# Patient Record
Sex: FEMALE | Race: WHITE | Employment: FULL TIME | URBAN - METROPOLITAN AREA
[De-identification: names, ages, dates, MRNs, and addresses within clinical notes are randomized per-mention and may not be internally consistent; named-entity substitution may affect disease eponyms.]

---

## 2020-08-26 ENCOUNTER — TRANSFERRED RECORDS (OUTPATIENT)
Dept: HEALTH INFORMATION MANAGEMENT | Facility: CLINIC | Age: 55
End: 2020-08-26

## 2020-08-26 ENCOUNTER — MEDICAL CORRESPONDENCE (OUTPATIENT)
Dept: HEALTH INFORMATION MANAGEMENT | Facility: CLINIC | Age: 55
End: 2020-08-26

## 2020-08-27 ENCOUNTER — TELEPHONE (OUTPATIENT)
Dept: PLASTIC SURGERY | Facility: CLINIC | Age: 55
End: 2020-08-27

## 2020-08-27 NOTE — TELEPHONE ENCOUNTER
8/27/2020, 4:16 PM    Called patient at the contact number listed on file; no answer. No patient identifiers on VM. Left basic voicemail with contact info for patient to call back.    DAVE GutierrezT

## 2020-08-28 ENCOUNTER — TRANSCRIBE ORDERS (OUTPATIENT)
Dept: OTHER | Age: 55
End: 2020-08-28

## 2020-08-28 DIAGNOSIS — D16.9 OSTEOMA: Primary | ICD-10-CM

## 2020-09-02 ENCOUNTER — TELEPHONE (OUTPATIENT)
Dept: PLASTIC SURGERY | Facility: CLINIC | Age: 55
End: 2020-09-02

## 2020-09-02 NOTE — TELEPHONE ENCOUNTER
9/2/2020, 1:19 PM    Called patient at the contact number listed on file; no answer. No patient identifiers on VM. Left basic voicemail with contact info for patient to call back.    DAVE GutierrezT

## 2020-09-04 NOTE — TELEPHONE ENCOUNTER
FUTURE VISIT INFORMATION      FUTURE VISIT INFORMATION:    Date: 10/30/20    Time: 8:30am    Location: [facility] [place]  REFERRAL INFORMATION:    Referring providers clinic:  [facility]     Reason for visit/diagnosis  L temporal ?osteoma    RECORDS REQUESTED FROM:       Clinic name Comments Records Status Imaging Status   [facility] Request for recs sent 9/4- received and sent to scaning EPIC

## 2020-10-30 ENCOUNTER — OFFICE VISIT (OUTPATIENT)
Dept: PLASTIC SURGERY | Facility: CLINIC | Age: 55
End: 2020-10-30
Attending: PLASTIC SURGERY
Payer: COMMERCIAL

## 2020-10-30 ENCOUNTER — PRE VISIT (OUTPATIENT)
Dept: PLASTIC SURGERY | Facility: CLINIC | Age: 55
End: 2020-10-30

## 2020-10-30 ENCOUNTER — ANCILLARY PROCEDURE (OUTPATIENT)
Dept: ULTRASOUND IMAGING | Facility: CLINIC | Age: 55
End: 2020-10-30
Attending: PLASTIC SURGERY
Payer: COMMERCIAL

## 2020-10-30 VITALS
DIASTOLIC BLOOD PRESSURE: 86 MMHG | WEIGHT: 291 LBS | BODY MASS INDEX: 39.42 KG/M2 | HEART RATE: 91 BPM | HEIGHT: 72 IN | SYSTOLIC BLOOD PRESSURE: 136 MMHG | OXYGEN SATURATION: 97 %

## 2020-10-30 DIAGNOSIS — D23.39: Primary | ICD-10-CM

## 2020-10-30 DIAGNOSIS — D23.39: ICD-10-CM

## 2020-10-30 DIAGNOSIS — E66.01 MORBID OBESITY (H): ICD-10-CM

## 2020-10-30 PROBLEM — E78.00 HYPERCHOLESTEREMIA: Status: ACTIVE | Noted: 2020-10-30

## 2020-10-30 PROBLEM — F41.9 ANXIETY: Status: ACTIVE | Noted: 2020-10-30

## 2020-10-30 PROCEDURE — 99203 OFFICE O/P NEW LOW 30 MIN: CPT | Performed by: PLASTIC SURGERY

## 2020-10-30 PROCEDURE — 76536 US EXAM OF HEAD AND NECK: CPT | Performed by: RADIOLOGY

## 2020-10-30 RX ORDER — PEN NEEDLE, DIABETIC 32GX 5/32"
NEEDLE, DISPOSABLE MISCELLANEOUS
COMMUNITY
Start: 2020-08-20

## 2020-10-30 RX ORDER — FLASH GLUCOSE SENSOR
KIT MISCELLANEOUS
COMMUNITY
Start: 2019-12-04

## 2020-10-30 RX ORDER — BLOOD SUGAR DIAGNOSTIC
STRIP MISCELLANEOUS
COMMUNITY
Start: 2020-10-02

## 2020-10-30 RX ORDER — LEVOTHYROXINE SODIUM 200 UG/1
TABLET ORAL
COMMUNITY
Start: 2020-10-25

## 2020-10-30 RX ORDER — LISINOPRIL 20 MG/1
TABLET ORAL
COMMUNITY
Start: 2020-10-28

## 2020-10-30 RX ORDER — INSULIN LISPRO 100 [IU]/ML
INJECTION, SOLUTION INTRAVENOUS; SUBCUTANEOUS
COMMUNITY
Start: 2020-10-29

## 2020-10-30 RX ORDER — SEMAGLUTIDE 1.34 MG/ML
INJECTION, SOLUTION SUBCUTANEOUS
COMMUNITY
Start: 2020-10-23

## 2020-10-30 RX ORDER — GLIPIZIDE 10 MG/1
TABLET ORAL
COMMUNITY
Start: 2019-04-15

## 2020-10-30 RX ORDER — ATORVASTATIN CALCIUM 10 MG/1
10 TABLET, FILM COATED ORAL
COMMUNITY
Start: 2019-06-08

## 2020-10-30 RX ORDER — LANCETS 33 GAUGE
EACH MISCELLANEOUS
COMMUNITY
Start: 2020-10-19

## 2020-10-30 RX ORDER — METFORMIN HYDROCHLORIDE 750 MG/1
TABLET, EXTENDED RELEASE ORAL
COMMUNITY
Start: 2020-10-07

## 2020-10-30 RX ORDER — CONJUGATED ESTROGENS 0.62 MG/1
TABLET, FILM COATED ORAL
COMMUNITY
Start: 2020-10-01

## 2020-10-30 ASSESSMENT — PAIN SCALES - GENERAL: PAINLEVEL: NO PAIN (0)

## 2020-10-30 ASSESSMENT — MIFFLIN-ST. JEOR: SCORE: 2016.97

## 2020-10-30 NOTE — NURSING NOTE
Chief Complaint   Patient presents with     Consult     L temporal ? osteoma x3 y       Vitals:    10/30/20 0827   BP: 136/86   BP Location: Left arm   Patient Position: Chair   Cuff Size: Adult Large   Pulse: 91   SpO2: 97%   Height: 1.829 m (6')       There is no height or weight on file to calculate BMI.    Catracho Gray, EMT

## 2020-10-30 NOTE — PROGRESS NOTES
PLASTIC SURGERY HISTORY AND PHYSICAL    Stefany Yoon MRN# [id number]   Age: 55 year old YOB: 1965     Primary care provider: No primary care provider on file.    CHIEF COMPLAINT: LEFT temple lesion      HPI: 55 year old lady with history of acromegaly, Type II DM, HTN, HLD, presents for initial consultation regarding a LEFT temple prominence. She reports noticing this about 3 years ago. Denies antecedent trauma/insult. She reports gradual growth since then which has subjectively accelerated recently. Odell any pain, tenderness at site. She was recently evaluated by two dermatologists, the latter of whom expressed concern for bony undergrowth, osteoma. She did have a soft tissue lesion excised from her LEFT big toe in the remote past. This temple lesion does not impair her. She has not had imaging.    PMH:  Type II DM  HTN  HLD    PSH:  LEFT toe soft tissue lesion excision    FH:  No family history on file.    SH:  Social History     Tobacco Use     Smoking status: Never Smoker     Smokeless tobacco: Never Used   Substance Use Topics     Alcohol use: None     Drug use: None       MEDS:  No current outpatient medications on file.     No current facility-administered medications for this visit.        ALLERGIES:     Allergies   Allergen Reactions     Simvastatin Hives     Sulfa Drugs Hives     PN: LW Reaction: facial swelling, hives     Red Dye Other (See Comments)     Red dyes in cough syrup and cherry pie filling - swollen lips       ROS: Negative except for HPI    EXAM:  No acute distress, very pleasant  Regular  Nonlabored  LEFT TEMPLE with 1.5-2cm soft, mobile prominence, no obvious underlying bony changes, no obvious punctum, no erythema/drainage  Warm, well-perfused    ASSESSMENT/PLAN:  55 year old lady with history of acromegaly, Type II DM, HLD, HTN, with progressive growth of LEFT temple lesion, on exam feels largely soft tissue in nature. I will order an US to delineate whether this  involves only scalp skin or extends into the bone. If the former, this will likely be amenable to excision under local anesthesia in the clinic. We will arrange follow-up after her ultrasound has resulted.    At least 30 minutes was spent with patient, of which more than 50 percent of that time is spent discussing the diagnosis, prognosis, risk and benefits, instructions for management, and education.     Nivia Lyles MD  Pediatric Cleft and Craniofacial Surgery  Division of Plastic Surgery  [facility]  Pager: [phone]

## 2020-10-30 NOTE — LETTER
10/30/2020     RE: Stefany Yoon  [address]  [place] [address]     Dear Colleague,    Thank you for referring your patient, Stefany Yoon, to the [facility] PLASTIC [facility] SURGERY [facility] [place] [facility]. Please see a copy of my visit note below.    PLASTIC SURGERY HISTORY AND PHYSICAL    Stefany Yoon MRN# [id number]   Age: 55 year old YOB: 1965     Primary care provider: No primary care provider on file.    CHIEF COMPLAINT: LEFT temple lesion      HPI: 55 year old lady with history of acromegaly, Type II DM, HTN, HLD, presents for initial consultation regarding a LEFT temple prominence. She reports noticing this about 3 years ago. Denies antecedent trauma/insult. She reports gradual growth since then which has subjectively accelerated recently. Odell any pain, tenderness at site. She was recently evaluated by two dermatologists, the latter of whom expressed concern for bony undergrowth, osteoma. She did have a soft tissue lesion excised from her LEFT big toe in the remote past. This temple lesion does not impair her. She has not had imaging.    PMH:  Type II DM  HTN  HLD    PSH:  LEFT toe soft tissue lesion excision    FH:  No family history on file.    SH:  Social History     Tobacco Use     Smoking status: Never Smoker     Smokeless tobacco: Never Used   Substance Use Topics     Alcohol use: None     Drug use: None       MEDS:  No current outpatient medications on file.     No current facility-administered medications for this visit.        ALLERGIES:     Allergies   Allergen Reactions     Simvastatin Hives     Sulfa Drugs Hives     PN: LW Reaction: facial swelling, hives     Red Dye Other (See Comments)     Red dyes in cough syrup and cherry pie filling - swollen lips     ROS: Negative except for HPI    EXAM:  No acute distress, very pleasant  Regular  Nonlabored  LEFT TEMPLE with 1.5-2cm soft, mobile prominence, no  obvious underlying bony changes, no obvious punctum, no erythema/drainage  Warm, well-perfused    ASSESSMENT/PLAN:  55 year old lady with history of acromegaly, Type II DM, HLD, HTN, with progressive growth of LEFT temple lesion, on exam feels largely soft tissue in nature. I will order an US to delineate whether this involves only scalp skin or extends into the bone. If the former, this will likely be amenable to excision under local anesthesia in the clinic. We will arrange follow-up after her ultrasound has resulted.    At least 30 minutes was spent with patient, of which more than 50 percent of that time is spent discussing the diagnosis, prognosis, risk and benefits, instructions for management, and education.     Nivia Lyles MD  Pediatric Cleft and Craniofacial Surgery  Division of Plastic Surgery  [facility]  Pager: [phone]

## 2020-11-04 ENCOUNTER — TELEPHONE (OUTPATIENT)
Dept: PLASTIC SURGERY | Facility: CLINIC | Age: 55
End: 2020-11-04

## 2020-11-04 NOTE — TELEPHONE ENCOUNTER
EMILIEM for patient to call back to schedule in person procedure appointment with Dr. Lyles to remove Lipoma.     Sang Pizarro LPN

## 2020-11-27 ENCOUNTER — OFFICE VISIT (OUTPATIENT)
Dept: PLASTIC SURGERY | Facility: CLINIC | Age: 55
End: 2020-11-27
Payer: COMMERCIAL

## 2020-11-27 VITALS
SYSTOLIC BLOOD PRESSURE: 131 MMHG | BODY MASS INDEX: 39.47 KG/M2 | HEART RATE: 105 BPM | HEIGHT: 72 IN | DIASTOLIC BLOOD PRESSURE: 86 MMHG | OXYGEN SATURATION: 96 %

## 2020-11-27 DIAGNOSIS — D23.39: Primary | ICD-10-CM

## 2020-11-27 PROCEDURE — 11441 EXC FACE-MM B9+MARG 0.6-1 CM: CPT | Performed by: PLASTIC SURGERY

## 2020-11-27 PROCEDURE — 88304 TISSUE EXAM BY PATHOLOGIST: CPT | Performed by: PATHOLOGY

## 2020-11-27 RX ORDER — LIDOCAINE HYDROCHLORIDE AND EPINEPHRINE 10; 10 MG/ML; UG/ML
1 INJECTION, SOLUTION INFILTRATION; PERINEURAL ONCE
Status: COMPLETED | OUTPATIENT
Start: 2020-11-27 | End: 2020-11-27

## 2020-11-27 RX ADMIN — LIDOCAINE HYDROCHLORIDE AND EPINEPHRINE 10 ML: 10; 10 INJECTION, SOLUTION INFILTRATION; PERINEURAL at 12:50

## 2020-11-27 ASSESSMENT — PAIN SCALES - GENERAL: PAINLEVEL: NO PAIN (0)

## 2020-11-27 NOTE — PROGRESS NOTES
PLASTIC SURGERY OUTPATIENT NOTE     SUBJECTIVE  No new events. CT obtained, suspicious for lipoma.    PHYSICAL EXAM  No acute distress  Regular  Nonlabored  LEFT FOREHEAD With 1x1cm soft tissue prominence just posterior to hairline, mobile, nontender, no erythema/drainage  Warm, well-perfused    ASSESSMENT/PLAN  55 year old lady with LEFT forehead soft tissue lesion suspicious for lipoma   - This was excised in clinic as follows  - Written consent obtained. Area dotted and incision marked. Area infiltrated with 1% lidocaine with epinephrine. Minutes elapsed for vasoconstriction. Area prepped with betadine, draped with sterile towels. Incised with #15 blade. Tenotomy dissection to excuse fatty lesion in two pieces. Electrocautery for hemostatsis. Wound irrigated with saline. Closure completed with interrupted buried 2-0 monocryl suture followed by simple running 5-0 fast gut stitch. Washed/dried and dressed with bacitracin ointment.  - Patient will follow-up next week for wound check and possibly to discuss pathology results    Nivia Lyles MD  Pediatric Cleft and Craniofacial Surgery  Division of Plastic Surgery  Pager: [phone]

## 2020-11-27 NOTE — LETTER
11/27/2020       RE: Stefany Yoon  [address]  [place] [facility] [address]     Dear Colleague,    Thank you for referring your patient, Stefany Yoon, to the [facility] PLASTIC AND RECONSTRUCTIVE SURGERY CLINIC [place] at [facility]. Please see a copy of my visit note below.    PLASTIC SURGERY OUTPATIENT NOTE     SUBJECTIVE  No new events. CT obtained, suspicious for lipoma.    PHYSICAL EXAM  No acute distress  Regular  Nonlabored  LEFT FOREHEAD With 1x1cm soft tissue prominence just posterior to hairline, mobile, nontender, no erythema/drainage  Warm, well-perfused    ASSESSMENT/PLAN  55 year old lady with LEFT forehead soft tissue lesion suspicious for lipoma   - This was excised in clinic as follows  - Written consent obtained. Area dotted and incision marked. Area infiltrated with 1% lidocaine with epinephrine. Minutes elapsed for vasoconstriction. Area prepped with betadine, draped with sterile towels. Incised with #15 blade. Tenotomy dissection to excuse fatty lesion in two pieces. Electrocautery for hemostatsis. Wound irrigated with saline. Closure completed with interrupted buried 2-0 monocryl suture followed by simple running 5-0 fast gut stitch. Washed/dried and dressed with bacitracin ointment.  - Patient will follow-up next week for wound check and possibly to discuss pathology results    Nivia Lyles MD  Pediatric Cleft and Craniofacial Surgery  Division of Plastic Surgery  Pager: [phone]            Again, thank you for allowing me to participate in the care of your patient.      Sincerely,    Nivia Lyles MD

## 2020-11-27 NOTE — NURSING NOTE
Chief Complaint   Patient presents with     RECHECK     L temporal lipoma excision       Vitals:    11/27/20 1241   BP: 131/86   BP Location: Left arm   Patient Position: Chair   Cuff Size: Adult Large   Pulse: 105   SpO2: 96%   Height: 1.829 m (6')       Body mass index is 39.47 kg/m .    Catracho Gray, EMT

## 2020-11-27 NOTE — LETTER
Date:February 4, 2021      Patient was self referred, no letter generated. Do not send.        [facility] Health Information

## 2020-12-01 LAB — COPATH REPORT: NORMAL

## 2020-12-04 ENCOUNTER — OFFICE VISIT (OUTPATIENT)
Dept: PLASTIC SURGERY | Facility: CLINIC | Age: 55
End: 2020-12-04
Payer: COMMERCIAL

## 2020-12-04 VITALS
SYSTOLIC BLOOD PRESSURE: 150 MMHG | BODY MASS INDEX: 39.47 KG/M2 | TEMPERATURE: 97.3 F | DIASTOLIC BLOOD PRESSURE: 91 MMHG | OXYGEN SATURATION: 96 % | HEART RATE: 101 BPM | HEIGHT: 72 IN

## 2020-12-04 DIAGNOSIS — D17.0 LIPOMA OF FOREHEAD: Primary | ICD-10-CM

## 2020-12-04 PROCEDURE — 99024 POSTOP FOLLOW-UP VISIT: CPT | Performed by: PLASTIC SURGERY

## 2020-12-04 ASSESSMENT — PAIN SCALES - GENERAL: PAINLEVEL: NO PAIN (0)

## 2020-12-04 NOTE — PROGRESS NOTES
PLASTIC SURGERY OUTPATIENT NOTE     SUBJECTIVE  Some bruising/swelling around incision and eye. Otherwise no complanits.    PHYSICAL EXAM  No acute distress  Regular  Nonlabored  LEFT FOREHEAD INCISION c/d/i, edematous/ecchymotic, no ballotable fluid, no erythema, no expressible drainage, LEFT PERIORBITAL REGION also ecchymotic  Warm, well-perfused    ASSESSMENT/PLAN  55 year old lady with history of LEFT forehead lipoma status post in-office excision 11/27  - Continue aquaphor/vaseline ointment to incision until healed  - Call if any new bleeding/bruising  - Otherwise follow-up in 1 month and 3 months for wound checks    Nivia Lyles MD  Pediatric Cleft and Craniofacial Surgery  Division of Plastic Surgery  Pager: [phone]

## 2020-12-04 NOTE — LETTER
Date:February 11, 2021      Patient was self referred, no letter generated. Do not send.        [facility] Health Information

## 2020-12-04 NOTE — LETTER
12/4/2020       RE: Stefany Yoon  [address]  [place] [address]     Dear Colleague,    Thank you for referring your patient, Stefany Yoon, to the [facility] PLASTIC AND RECONSTRUCTIVE SURGERY CLINIC [place] at [facility]. Please see a copy of my visit note below.    PLASTIC SURGERY OUTPATIENT NOTE     SUBJECTIVE  Some bruising/swelling around incision and eye. Otherwise no complanits.    PHYSICAL EXAM  No acute distress  Regular  Nonlabored  LEFT FOREHEAD INCISION c/d/i, edematous/ecchymotic, no ballotable fluid, no erythema, no expressible drainage, LEFT PERIORBITAL REGION also ecchymotic  Warm, well-perfused    ASSESSMENT/PLAN  55 year old lady with history of LEFT forehead lipoma status post in-office excision 11/27  - Continue aquaphor/vaseline ointment to incision until healed  - Call if any new bleeding/bruising  - Otherwise follow-up in 1 month and 3 months for wound checks    Nivia Lyles MD  Pediatric Cleft and Craniofacial Surgery  Division of Plastic Surgery  Pager: [phone]            Again, thank you for allowing me to participate in the care of your patient.      Sincerely,    Nivia Lyles MD

## 2021-01-08 ENCOUNTER — OFFICE VISIT (OUTPATIENT)
Dept: PLASTIC SURGERY | Facility: CLINIC | Age: 56
End: 2021-01-08
Payer: COMMERCIAL

## 2021-01-08 VITALS
DIASTOLIC BLOOD PRESSURE: 87 MMHG | BODY MASS INDEX: 39.42 KG/M2 | TEMPERATURE: 98.2 F | RESPIRATION RATE: 18 BRPM | SYSTOLIC BLOOD PRESSURE: 148 MMHG | HEART RATE: 98 BPM | OXYGEN SATURATION: 95 % | WEIGHT: 291 LBS | HEIGHT: 72 IN

## 2021-01-08 DIAGNOSIS — D17.0 LIPOMA OF FOREHEAD: Primary | ICD-10-CM

## 2021-01-08 PROCEDURE — 99024 POSTOP FOLLOW-UP VISIT: CPT | Performed by: PLASTIC SURGERY

## 2021-01-08 ASSESSMENT — PAIN SCALES - GENERAL: PAINLEVEL: NO PAIN (0)

## 2021-01-08 ASSESSMENT — MIFFLIN-ST. JEOR: SCORE: 2016.97

## 2021-01-08 NOTE — LETTER
Date:March 14, 2021      Patient was self referred, no letter generated. Do not send.        [facility] Health Information

## 2021-01-08 NOTE — LETTER
1/8/2021       RE: Stefany Yoon  [address]  [facility] [address]     Dear Colleague,    Thank you for referring your patient, Stefany Yoon, to the [facility] PLASTIC AND RECONSTRUCTIVE SURGERY CLINIC [place] at [facility]. Please see a copy of my visit note below.    PLASTIC SURGERY OUTPATIENT NOTE     SUBJECTIVE  No complaints today.    PHYSICAL EXAM  No acute distress  Regular  Nonlabored  LEFT FOREHEAD INCISION c/d/i, edematous/ecchymotic, residual soft tissue fulness  Warm, well-perfused    ASSESSMENT/PLAN  55 year old lady with history of LEFT forehead lipoma status post in-office excision 11/27  - Continue aquaphor/vaseline ointment to incision until healed  - Scar massage  - recheck fulness at final visit, if still prominent, we may pursue re-excision  - follow-up for final visit    Nivia Lyles MD  Pediatric Cleft and Craniofacial Surgery  Division of Plastic Surgery  Pager: [phone]            Again, thank you for allowing me to participate in the care of your patient.      Sincerely,    Nivia Lyles MD

## 2021-01-08 NOTE — PROGRESS NOTES
PLASTIC SURGERY OUTPATIENT NOTE     SUBJECTIVE  No complaints today.    PHYSICAL EXAM  No acute distress  Regular  Nonlabored  LEFT FOREHEAD INCISION c/d/i, edematous/ecchymotic, residual soft tissue fulness  Warm, well-perfused    ASSESSMENT/PLAN  55 year old lady with history of LEFT forehead lipoma status post in-office excision 11/27  - Continue aquaphor/vaseline ointment to incision until healed  - Scar massage  - recheck fulness at final visit, if still prominent, we may pursue re-excision  - follow-up for final visit    Nivia Lyles MD  Pediatric Cleft and Craniofacial Surgery  Division of Plastic Surgery  Pager: [phone]

## 2021-01-08 NOTE — NURSING NOTE
Chief Complaint   Patient presents with     Surgical Followup     Pt here for post op       Vitals:    01/08/21 1243   BP: (!) 148/87   BP Location: Left arm   Patient Position: Sitting   Cuff Size: Adult Large   Pulse: 98   Resp: 18   Temp: 98.2  F (36.8  C)   TempSrc: Oral   SpO2: 95%   Weight: 132 kg (291 lb)   Height: 1.829 m (6')       Body mass index is 39.47 kg/m .      YUDITH Riley NREMT

## 2021-02-26 ENCOUNTER — OFFICE VISIT (OUTPATIENT)
Dept: PLASTIC SURGERY | Facility: CLINIC | Age: 56
End: 2021-02-26
Payer: COMMERCIAL

## 2021-02-26 VITALS
DIASTOLIC BLOOD PRESSURE: 77 MMHG | SYSTOLIC BLOOD PRESSURE: 129 MMHG | BODY MASS INDEX: 39.42 KG/M2 | WEIGHT: 291 LBS | HEART RATE: 94 BPM | HEIGHT: 72 IN | OXYGEN SATURATION: 95 %

## 2021-02-26 DIAGNOSIS — D17.0 LIPOMA OF FOREHEAD: Primary | ICD-10-CM

## 2021-02-26 PROCEDURE — 99213 OFFICE O/P EST LOW 20 MIN: CPT | Performed by: PLASTIC SURGERY

## 2021-02-26 ASSESSMENT — PAIN SCALES - GENERAL: PAINLEVEL: NO PAIN (0)

## 2021-02-26 ASSESSMENT — MIFFLIN-ST. JEOR: SCORE: 2016.97

## 2021-02-26 NOTE — NURSING NOTE
Chief Complaint   Patient presents with     RECHECK     3 month follow up.       Vitals:    02/26/21 1139   BP: 129/77   Pulse: 94   SpO2: 95%   Weight: 132 kg (291 lb)   Height: 1.829 m (6')       Body mass index is 39.47 kg/m .    Sang Pizarro LPN

## 2021-02-26 NOTE — LETTER
Date:April 29, 2021      Patient was self referred, no letter generated. Do not send.        [facility] Health Information

## 2021-02-26 NOTE — PROGRESS NOTES
PLASTIC SURGERY OUTPATIENT NOTE     SUBJECTIVE  No new complaints.    PHYSICAL EXAM  No acute distress  Regular  Nonlabored  LEFT FOREHEAD INCISION c/d/i, edematous/ecchymotic, residual soft tissue fulness now much improved  Warm, well-perfused    ASSESSMENT/PLAN  55 year old lady with history of LEFT forehead lipoma status post in-office excision 11/27  - Continue aquaphor/vaseline ointment to incision until healed  - Scar massage  - follow-up prn should she have any wound healing issues    Total time spent with for this visit was 20 minutes, including review of documentation, counseling/discussion with patient, documentation, and organizing any potential follow-up.     Nivia Lyles MD  Pediatric Cleft and Craniofacial Surgery  Division of Plastic Surgery  Pager: [phone]

## 2021-02-26 NOTE — LETTER
2/26/2021       RE: Stefany Yoon  [address]  [place] [facility] [address]     Dear Colleague,    Thank you for referring your patient, Stefany Yoon, to the [facility] PLASTIC AND RECONSTRUCTIVE SURGERY CLINIC [place] [facility]. Please see a copy of my visit note below.    PLASTIC SURGERY OUTPATIENT NOTE     SUBJECTIVE  No new complaints.    PHYSICAL EXAM  No acute distress  Regular  Nonlabored  LEFT FOREHEAD INCISION c/d/i, edematous/ecchymotic, residual soft tissue fulness now much improved  Warm, well-perfused    ASSESSMENT/PLAN  55 year old lady with history of LEFT forehead lipoma status post in-office excision 11/27  - Continue aquaphor/vaseline ointment to incision until healed  - Scar massage  - follow-up prn should she have any wound healing issues    Total time spent with for this visit was 20 minutes, including review of documentation, counseling/discussion with patient, documentation, and organizing any potential follow-up.     Nivia Lyles MD  Pediatric Cleft and Craniofacial Surgery  Division of Plastic Surgery  Pager: [phone]            Again, thank you for allowing me to participate in the care of your patient.      Sincerely,    Nivia Lyles MD

## 2021-05-30 ENCOUNTER — HEALTH MAINTENANCE LETTER (OUTPATIENT)
Age: 56
End: 2021-05-30

## 2021-09-19 ENCOUNTER — HEALTH MAINTENANCE LETTER (OUTPATIENT)
Age: 56
End: 2021-09-19

## 2022-01-08 ENCOUNTER — HEALTH MAINTENANCE LETTER (OUTPATIENT)
Age: 57
End: 2022-01-08

## 2022-04-30 ENCOUNTER — HEALTH MAINTENANCE LETTER (OUTPATIENT)
Age: 57
End: 2022-04-30

## 2022-06-25 ENCOUNTER — HEALTH MAINTENANCE LETTER (OUTPATIENT)
Age: 57
End: 2022-06-25

## 2022-08-20 ENCOUNTER — HEALTH MAINTENANCE LETTER (OUTPATIENT)
Age: 57
End: 2022-08-20

## 2022-11-20 ENCOUNTER — HEALTH MAINTENANCE LETTER (OUTPATIENT)
Age: 57
End: 2022-11-20

## 2022-12-09 ENCOUNTER — LAB REQUISITION (OUTPATIENT)
Dept: LAB | Facility: CLINIC | Age: 57
End: 2022-12-09
Payer: COMMERCIAL

## 2022-12-09 DIAGNOSIS — N95.0 POSTMENOPAUSAL BLEEDING: ICD-10-CM

## 2022-12-09 PROCEDURE — 88305 TISSUE EXAM BY PATHOLOGIST: CPT | Mod: TC,ORL | Performed by: OBSTETRICS & GYNECOLOGY

## 2022-12-13 LAB
PATH REPORT.COMMENTS IMP SPEC: NORMAL
PATH REPORT.COMMENTS IMP SPEC: NORMAL
PATH REPORT.FINAL DX SPEC: NORMAL
PATH REPORT.GROSS SPEC: NORMAL
PATH REPORT.MICROSCOPIC SPEC OTHER STN: NORMAL
PATH REPORT.RELEVANT HX SPEC: NORMAL
PHOTO IMAGE: NORMAL

## 2022-12-13 PROCEDURE — 88305 TISSUE EXAM BY PATHOLOGIST: CPT | Mod: 26 | Performed by: PATHOLOGY

## 2022-12-24 ENCOUNTER — HEALTH MAINTENANCE LETTER (OUTPATIENT)
Age: 57
End: 2022-12-24

## 2023-04-15 ENCOUNTER — HEALTH MAINTENANCE LETTER (OUTPATIENT)
Age: 58
End: 2023-04-15

## 2023-07-08 ENCOUNTER — HEALTH MAINTENANCE LETTER (OUTPATIENT)
Age: 58
End: 2023-07-08

## 2023-09-16 ENCOUNTER — HEALTH MAINTENANCE LETTER (OUTPATIENT)
Age: 58
End: 2023-09-16

## 2024-02-03 ENCOUNTER — HEALTH MAINTENANCE LETTER (OUTPATIENT)
Age: 59
End: 2024-02-03

## 2024-04-29 ENCOUNTER — LAB REQUISITION (OUTPATIENT)
Dept: LAB | Facility: CLINIC | Age: 59
End: 2024-04-29

## 2024-04-29 DIAGNOSIS — N93.9 ABNORMAL UTERINE AND VAGINAL BLEEDING, UNSPECIFIED: ICD-10-CM

## 2024-04-29 PROCEDURE — 88305 TISSUE EXAM BY PATHOLOGIST: CPT | Performed by: STUDENT IN AN ORGANIZED HEALTH CARE EDUCATION/TRAINING PROGRAM

## 2024-05-02 LAB
PATH REPORT.COMMENTS IMP SPEC: NORMAL
PATH REPORT.FINAL DX SPEC: NORMAL
PATH REPORT.GROSS SPEC: NORMAL
PATH REPORT.MICROSCOPIC SPEC OTHER STN: NORMAL
PATH REPORT.RELEVANT HX SPEC: NORMAL
PHOTO IMAGE: NORMAL

## (undated) RX ORDER — LIDOCAINE HYDROCHLORIDE AND EPINEPHRINE 10; 10 MG/ML; UG/ML
INJECTION, SOLUTION INFILTRATION; PERINEURAL
Status: DISPENSED
Start: 2020-11-27